# Patient Record
Sex: MALE | Race: OTHER | HISPANIC OR LATINO | URBAN - METROPOLITAN AREA
[De-identification: names, ages, dates, MRNs, and addresses within clinical notes are randomized per-mention and may not be internally consistent; named-entity substitution may affect disease eponyms.]

---

## 2019-01-04 ENCOUNTER — EMERGENCY (EMERGENCY)
Facility: HOSPITAL | Age: 59
LOS: 1 days | Discharge: HOME | End: 2019-01-04
Attending: EMERGENCY MEDICINE | Admitting: EMERGENCY MEDICINE
Payer: COMMERCIAL

## 2019-01-04 VITALS
DIASTOLIC BLOOD PRESSURE: 88 MMHG | RESPIRATION RATE: 18 BRPM | SYSTOLIC BLOOD PRESSURE: 158 MMHG | OXYGEN SATURATION: 100 % | TEMPERATURE: 98 F | HEART RATE: 93 BPM

## 2019-01-04 LAB
ALBUMIN SERPL ELPH-MCNC: 4.4 G/DL — SIGNIFICANT CHANGE UP (ref 3.3–5)
ALP SERPL-CCNC: 103 U/L — SIGNIFICANT CHANGE UP (ref 40–120)
ALT FLD-CCNC: 19 U/L — SIGNIFICANT CHANGE UP (ref 4–41)
AST SERPL-CCNC: 10 U/L — SIGNIFICANT CHANGE UP (ref 4–40)
B-OH-BUTYR SERPL-SCNC: 0.1 MMOL/L — SIGNIFICANT CHANGE UP (ref 0–0.4)
BASE EXCESS BLDV CALC-SCNC: -0.6 MMOL/L — SIGNIFICANT CHANGE UP
BILIRUB SERPL-MCNC: < 0.2 MG/DL — LOW (ref 0.2–1.2)
BLOOD GAS VENOUS - CREATININE: 1.2 MG/DL — SIGNIFICANT CHANGE UP (ref 0.5–1.3)
BUN SERPL-MCNC: 20 MG/DL — SIGNIFICANT CHANGE UP (ref 7–23)
CALCIUM SERPL-MCNC: 9.3 MG/DL — SIGNIFICANT CHANGE UP (ref 8.4–10.5)
CHLORIDE BLDV-SCNC: 103 MMOL/L — SIGNIFICANT CHANGE UP (ref 96–108)
CHLORIDE SERPL-SCNC: 99 MMOL/L — SIGNIFICANT CHANGE UP (ref 98–107)
CO2 SERPL-SCNC: 21 MMOL/L — LOW (ref 22–31)
CREAT SERPL-MCNC: 1.27 MG/DL — SIGNIFICANT CHANGE UP (ref 0.5–1.3)
GAS PNL BLDV: 136 MMOL/L — SIGNIFICANT CHANGE UP (ref 136–146)
GLUCOSE BLDV-MCNC: 343 — HIGH (ref 70–99)
GLUCOSE SERPL-MCNC: 342 MG/DL — HIGH (ref 70–99)
HBA1C BLD-MCNC: 11.7 % — HIGH (ref 4–5.6)
HCO3 BLDV-SCNC: 24 MMOL/L — SIGNIFICANT CHANGE UP (ref 20–27)
HCT VFR BLD CALC: 46.4 % — SIGNIFICANT CHANGE UP (ref 39–50)
HCT VFR BLDV CALC: 50.2 % — SIGNIFICANT CHANGE UP (ref 39–51)
HGB BLD-MCNC: 15.8 G/DL — SIGNIFICANT CHANGE UP (ref 13–17)
HGB BLDV-MCNC: 16.4 G/DL — SIGNIFICANT CHANGE UP (ref 13–17)
LACTATE BLDV-MCNC: 2.4 MMOL/L — HIGH (ref 0.5–2)
MCHC RBC-ENTMCNC: 25.9 PG — LOW (ref 27–34)
MCHC RBC-ENTMCNC: 34.1 % — SIGNIFICANT CHANGE UP (ref 32–36)
MCV RBC AUTO: 76.2 FL — LOW (ref 80–100)
NRBC # FLD: 0 K/UL — LOW (ref 25–125)
PCO2 BLDV: 39 MMHG — LOW (ref 41–51)
PH BLDV: 7.4 PH — SIGNIFICANT CHANGE UP (ref 7.32–7.43)
PLATELET # BLD AUTO: 292 K/UL — SIGNIFICANT CHANGE UP (ref 150–400)
PMV BLD: 10 FL — SIGNIFICANT CHANGE UP (ref 7–13)
PO2 BLDV: 54 MMHG — HIGH (ref 35–40)
POTASSIUM BLDV-SCNC: 3.8 MMOL/L — SIGNIFICANT CHANGE UP (ref 3.4–4.5)
POTASSIUM SERPL-MCNC: 3.8 MMOL/L — SIGNIFICANT CHANGE UP (ref 3.5–5.3)
POTASSIUM SERPL-SCNC: 3.8 MMOL/L — SIGNIFICANT CHANGE UP (ref 3.5–5.3)
PROT SERPL-MCNC: 7.6 G/DL — SIGNIFICANT CHANGE UP (ref 6–8.3)
RBC # BLD: 6.09 M/UL — HIGH (ref 4.2–5.8)
RBC # FLD: 14 % — SIGNIFICANT CHANGE UP (ref 10.3–14.5)
SAO2 % BLDV: 85.9 % — HIGH (ref 60–85)
SODIUM SERPL-SCNC: 133 MMOL/L — LOW (ref 135–145)
WBC # BLD: 6.17 K/UL — SIGNIFICANT CHANGE UP (ref 3.8–10.5)
WBC # FLD AUTO: 6.17 K/UL — SIGNIFICANT CHANGE UP (ref 3.8–10.5)

## 2019-01-04 PROCEDURE — 99245 OFF/OP CONSLTJ NEW/EST HI 55: CPT

## 2019-01-04 PROCEDURE — 99218: CPT

## 2019-01-04 RX ORDER — DEXTROSE 50 % IN WATER 50 %
15 SYRINGE (ML) INTRAVENOUS ONCE
Qty: 0 | Refills: 0 | Status: DISCONTINUED | OUTPATIENT
Start: 2019-01-04 | End: 2019-01-08

## 2019-01-04 RX ORDER — INSULIN LISPRO 100/ML
VIAL (ML) SUBCUTANEOUS AT BEDTIME
Qty: 0 | Refills: 0 | Status: DISCONTINUED | OUTPATIENT
Start: 2019-01-04 | End: 2019-01-08

## 2019-01-04 RX ORDER — SODIUM CHLORIDE 9 MG/ML
1000 INJECTION INTRAMUSCULAR; INTRAVENOUS; SUBCUTANEOUS ONCE
Qty: 0 | Refills: 0 | Status: COMPLETED | OUTPATIENT
Start: 2019-01-04 | End: 2019-01-04

## 2019-01-04 RX ORDER — FENOFIBRATE,MICRONIZED 130 MG
145 CAPSULE ORAL DAILY
Qty: 0 | Refills: 0 | Status: DISCONTINUED | OUTPATIENT
Start: 2019-01-04 | End: 2019-01-08

## 2019-01-04 RX ORDER — INSULIN LISPRO 100/ML
7 VIAL (ML) SUBCUTANEOUS
Qty: 0 | Refills: 0 | Status: DISCONTINUED | OUTPATIENT
Start: 2019-01-04 | End: 2019-01-08

## 2019-01-04 RX ORDER — GLUCAGON INJECTION, SOLUTION 0.5 MG/.1ML
1 INJECTION, SOLUTION SUBCUTANEOUS ONCE
Qty: 0 | Refills: 0 | Status: DISCONTINUED | OUTPATIENT
Start: 2019-01-04 | End: 2019-01-08

## 2019-01-04 RX ORDER — DEXTROSE 50 % IN WATER 50 %
25 SYRINGE (ML) INTRAVENOUS ONCE
Qty: 0 | Refills: 0 | Status: DISCONTINUED | OUTPATIENT
Start: 2019-01-04 | End: 2019-01-08

## 2019-01-04 RX ORDER — LISINOPRIL 2.5 MG/1
2.5 TABLET ORAL DAILY
Qty: 0 | Refills: 0 | Status: DISCONTINUED | OUTPATIENT
Start: 2019-01-04 | End: 2019-01-08

## 2019-01-04 RX ORDER — SODIUM CHLORIDE 9 MG/ML
1000 INJECTION, SOLUTION INTRAVENOUS
Qty: 0 | Refills: 0 | Status: DISCONTINUED | OUTPATIENT
Start: 2019-01-04 | End: 2019-01-08

## 2019-01-04 RX ORDER — INSULIN GLARGINE 100 [IU]/ML
21 INJECTION, SOLUTION SUBCUTANEOUS AT BEDTIME
Qty: 0 | Refills: 0 | Status: DISCONTINUED | OUTPATIENT
Start: 2019-01-04 | End: 2019-01-08

## 2019-01-04 RX ORDER — INSULIN LISPRO 100/ML
VIAL (ML) SUBCUTANEOUS
Qty: 0 | Refills: 0 | Status: DISCONTINUED | OUTPATIENT
Start: 2019-01-04 | End: 2019-01-08

## 2019-01-04 RX ORDER — DEXTROSE 50 % IN WATER 50 %
12.5 SYRINGE (ML) INTRAVENOUS ONCE
Qty: 0 | Refills: 0 | Status: DISCONTINUED | OUTPATIENT
Start: 2019-01-04 | End: 2019-01-08

## 2019-01-04 RX ADMIN — SODIUM CHLORIDE 1000 MILLILITER(S): 9 INJECTION INTRAMUSCULAR; INTRAVENOUS; SUBCUTANEOUS at 23:32

## 2019-01-04 NOTE — ED ADULT TRIAGE NOTE - CHIEF COMPLAINT QUOTE
Pt with PMH of DM and HTN, sent by PCP for eval of uncontrolled diabetes. Pt states that he went to PCP for a regular checkup due to an episode of vomiting he had yesterday, none today. Pt states he visited family members over the holidays who were sick. Pt on Metformin, reports taking it as prescribed, not checking his BS at home due to lack of test strips. Fs 353

## 2019-01-04 NOTE — ED PROVIDER NOTE - MEDICAL DECISION MAKING DETAILS
58M, hx DM HTN, sent to ED to eval for generalized fatigue and poorly controlled DM. No current symptoms other than generalized fatigue. . Will check labs including blood gas, HBa1C, BetaHydroxy. Likely CDU for further care, endo eval. Currently stable, no acute distress. Will continue to follow up and re-assess. Case discussed with Attending: Dane Portillo MD, PGY2 Emergency Medicine

## 2019-01-04 NOTE — ED CDU PROVIDER INITIAL DAY NOTE - ATTENDING CONTRIBUTION TO CARE
CDU MD ALARCON:  I performed a face to face bedside interview with patient regarding history of present illness, review of symptoms and past medical history. I completed an independent physical exam.  I have discussed patient's plan of care with PA.   I agree with note as stated above, having amended the EMR as needed to reflect my findings. I have discussed the assessment and plan of care.  This includes during the time I functioned as the attending physician for this patient.    57 y/o male poorly controlled diabetic sent by pcp to ED for better management of hyperglycemia.  No DKA.  Sent to CDU for endo consultation and recs in am.

## 2019-01-04 NOTE — ED CDU PROVIDER INITIAL DAY NOTE - MEDICAL DECISION MAKING DETAILS
Uncontrolled diabetes- IVF, Insulin recommendations as per endo :  Lantus 21 at bed time, humalog 7 per meal, moderate sliding for meal and bed time, Check FS, Endo consult AM with rpt morning labs

## 2019-01-04 NOTE — ED CDU PROVIDER INITIAL DAY NOTE - OBJECTIVE STATEMENT
58M, hx DM HTN presents from PCP due to poorly controlled blood glucose. Per patient, has been working very hard over last month, and has felt very weak, tired, run down over that period of time. Went to see PCP today (Heri Case) and SF was noted to be >450. patient sent to ED for further assessment. Patient states he vomited x1 yesterday, but has had many recent sick contacts. Denies any nausea or vomiting today. NO recent fevers or chills, headache, dizziness, lightheadedness, blurry vision, chest pain, shortness of breath, MOLINA, abdominal pain, urinary sx, cough, congestion, numbness. Has lost ~60 lbs over last year period with dieting. Denies tobacco, ethanol, or drug use. Has not checked FS in over 3 months due to lack of test strips. Daily meds include repaglinide 2mg  three times a day and glipizide 10mg  for his sugar which he States he is compliant with.   Does not follow with endocrinology. Currently denies symptoms other than generalized fatigue.   Pt sent to CDU for endo consult.  KEVEN austin who provided insulin recommendations/ will see pt in CDU in morning.

## 2019-01-04 NOTE — ED PROVIDER NOTE - OBJECTIVE STATEMENT
58M, hx DM HTN presents from PCP due to poorly controlled blood glucose. Per patient, has been working very hard over last month, and has felt very weak, tired, run down over that period of time. Went to see PCP today (Heri Case) and SF was noted to be >450. patient sent to ED for further assessment. Patient states he vomited x1 yesterday, but has had many recent sick contacts. Denies any nausea or vomiting today. NO recent fevers or chills, headache, dizziness, lightheadedness, blurry vision, chest pain, shortness of breath, MOLINA, abdominal pain, urinary sx, cough, congestion, numbness. Has lost ~100 lbs over last year period with dieting. Denies tobacco, ethanol, or drug use. Has not checked FS in over 3 months due to lack of test strips. Daily meds include repaglinide, fenofibrate, lisinopril, glipizide. States he is compliant with all meds. Does not follow with endocrinology. Currently denies symptoms other than generalized fatigue.

## 2019-01-04 NOTE — ED PROVIDER NOTE - ATTENDING CONTRIBUTION TO CARE
DR. ALARCON, ATTENDING MD-  I performed a face to face bedside interview with patient regarding history of present illness, review of symptoms and past medical history. I completed an independent physical exam.  I have discussed patient's plan of care with the resident.   Documentation as above in the note.    57 y/o male h/o dm, htn with high blood sugar, sent by pcp for eval.  Pt states n/v x1 yesterday and fatigue x1 month.  Denies f/c, ha, neck stiffness, cp, sob, cough, abd pain, n/v/d, dysuria, rash.  Afebrile, vs wnl, nad, ctabil, s1s2 rrr no m/r/g, abd soft non ttp no r/g, no cva tenderness b/l, no leg swelling b/l, no rash.  Poorly controlled diabetic, r/o dka, consider cdu for endo eval in am.

## 2019-01-04 NOTE — ED ADULT NURSE NOTE - OBJECTIVE STATEMENT
Ad RN: 58 y male A+Ox3 presents to the ER with the complaint of hyperglycemia. Pt states for the past couple of days he has been feeling under the weather and vomited yesterday prompting him to follow up with his pcp. At pcp, FS was 485 prompting PCP to to instruct pt to come to the ED. Pt states he has been taking oral diabetic meds and is currently not on insulin. States he has not been checking his FS for the past  3 months due to running out of test strips. Pt  states he has been urinating in the middle of the night 2-3x a night. 20 g iv placed on left forearm, labs drawn and sent. Endorsed report to primary RN Adiel.

## 2019-01-05 VITALS
SYSTOLIC BLOOD PRESSURE: 120 MMHG | DIASTOLIC BLOOD PRESSURE: 79 MMHG | OXYGEN SATURATION: 100 % | TEMPERATURE: 99 F | HEART RATE: 70 BPM | RESPIRATION RATE: 16 BRPM

## 2019-01-05 DIAGNOSIS — E78.3 HYPERCHYLOMICRONEMIA: ICD-10-CM

## 2019-01-05 DIAGNOSIS — I10 ESSENTIAL (PRIMARY) HYPERTENSION: ICD-10-CM

## 2019-01-05 DIAGNOSIS — E11.65 TYPE 2 DIABETES MELLITUS WITH HYPERGLYCEMIA: ICD-10-CM

## 2019-01-05 LAB
ALBUMIN SERPL ELPH-MCNC: 3.6 G/DL — SIGNIFICANT CHANGE UP (ref 3.3–5)
ALP SERPL-CCNC: 78 U/L — SIGNIFICANT CHANGE UP (ref 40–120)
ALT FLD-CCNC: 19 U/L — SIGNIFICANT CHANGE UP (ref 4–41)
AST SERPL-CCNC: 7 U/L — SIGNIFICANT CHANGE UP (ref 4–40)
BASE EXCESS BLDV CALC-SCNC: 1.1 MMOL/L — SIGNIFICANT CHANGE UP
BILIRUB SERPL-MCNC: 0.2 MG/DL — SIGNIFICANT CHANGE UP (ref 0.2–1.2)
BLOOD GAS VENOUS - CREATININE: 1.03 MG/DL — SIGNIFICANT CHANGE UP (ref 0.5–1.3)
BUN SERPL-MCNC: 17 MG/DL — SIGNIFICANT CHANGE UP (ref 7–23)
CALCIUM SERPL-MCNC: 9.1 MG/DL — SIGNIFICANT CHANGE UP (ref 8.4–10.5)
CHLORIDE BLDV-SCNC: 109 MMOL/L — HIGH (ref 96–108)
CHLORIDE SERPL-SCNC: 104 MMOL/L — SIGNIFICANT CHANGE UP (ref 98–107)
CO2 SERPL-SCNC: 24 MMOL/L — SIGNIFICANT CHANGE UP (ref 22–31)
CREAT SERPL-MCNC: 1.17 MG/DL — SIGNIFICANT CHANGE UP (ref 0.5–1.3)
GAS PNL BLDV: 132 MMOL/L — LOW (ref 136–146)
GLUCOSE BLDV-MCNC: 268 — HIGH (ref 70–99)
GLUCOSE SERPL-MCNC: 284 MG/DL — HIGH (ref 70–99)
HCO3 BLDV-SCNC: 25 MMOL/L — SIGNIFICANT CHANGE UP (ref 20–27)
HCT VFR BLDV CALC: 45 % — SIGNIFICANT CHANGE UP (ref 39–51)
HGB BLDV-MCNC: 14.7 G/DL — SIGNIFICANT CHANGE UP (ref 13–17)
LACTATE BLDV-MCNC: 1.6 MMOL/L — SIGNIFICANT CHANGE UP (ref 0.5–2)
PCO2 BLDV: 40 MMHG — LOW (ref 41–51)
PH BLDV: 7.41 PH — SIGNIFICANT CHANGE UP (ref 7.32–7.43)
PO2 BLDV: 52 MMHG — HIGH (ref 35–40)
POTASSIUM BLDV-SCNC: 3.4 MMOL/L — SIGNIFICANT CHANGE UP (ref 3.4–4.5)
POTASSIUM SERPL-MCNC: 3.7 MMOL/L — SIGNIFICANT CHANGE UP (ref 3.5–5.3)
POTASSIUM SERPL-SCNC: 3.7 MMOL/L — SIGNIFICANT CHANGE UP (ref 3.5–5.3)
PROT SERPL-MCNC: 6.1 G/DL — SIGNIFICANT CHANGE UP (ref 6–8.3)
SAO2 % BLDV: 84.1 % — SIGNIFICANT CHANGE UP (ref 60–85)
SODIUM SERPL-SCNC: 137 MMOL/L — SIGNIFICANT CHANGE UP (ref 135–145)

## 2019-01-05 PROCEDURE — 99217: CPT

## 2019-01-05 RX ORDER — METFORMIN HYDROCHLORIDE 850 MG/1
1 TABLET ORAL
Qty: 60 | Refills: 0 | OUTPATIENT
Start: 2019-01-05 | End: 2019-02-03

## 2019-01-05 RX ORDER — INSULIN LISPRO 100/ML
7 VIAL (ML) SUBCUTANEOUS
Qty: 1 | Refills: 0 | OUTPATIENT
Start: 2019-01-05 | End: 2019-02-03

## 2019-01-05 RX ORDER — ENOXAPARIN SODIUM 100 MG/ML
21 INJECTION SUBCUTANEOUS
Qty: 1 | Refills: 0 | OUTPATIENT
Start: 2019-01-05 | End: 2019-02-03

## 2019-01-05 RX ADMIN — INSULIN GLARGINE 21 UNIT(S): 100 INJECTION, SOLUTION SUBCUTANEOUS at 00:11

## 2019-01-05 RX ADMIN — LISINOPRIL 2.5 MILLIGRAM(S): 2.5 TABLET ORAL at 05:26

## 2019-01-05 RX ADMIN — Medication 7 UNIT(S): at 13:18

## 2019-01-05 RX ADMIN — Medication 2: at 13:19

## 2019-01-05 RX ADMIN — Medication 4: at 09:23

## 2019-01-05 RX ADMIN — Medication 7 UNIT(S): at 09:22

## 2019-01-05 NOTE — CONSULT NOTE ADULT - SUBJECTIVE AND OBJECTIVE BOX
HPI:      PAST MEDICAL & SURGICAL HISTORY:  HTN (hypertension)  DM (diabetes mellitus)  No significant past surgical history      FAMILY HISTORY:  No pertinent family history in first degree relatives      Social History:    Outpatient Medications:        MEDICATIONS  (STANDING):  dextrose 5%. 1000 milliLiter(s) (50 mL/Hr) IV Continuous <Continuous>  dextrose 50% Injectable 12.5 Gram(s) IV Push once  dextrose 50% Injectable 25 Gram(s) IV Push once  dextrose 50% Injectable 25 Gram(s) IV Push once  fenofibrate Tablet 145 milliGRAM(s) Oral daily  insulin glargine Injectable (LANTUS) 21 Unit(s) SubCutaneous at bedtime  insulin lispro (HumaLOG) corrective regimen sliding scale   SubCutaneous three times a day before meals  insulin lispro (HumaLOG) corrective regimen sliding scale   SubCutaneous at bedtime  insulin lispro Injectable (HumaLOG) 7 Unit(s) SubCutaneous three times a day before meals  lisinopril 2.5 milliGRAM(s) Oral daily    MEDICATIONS  (PRN):  dextrose 40% Gel 15 Gram(s) Oral once PRN Blood Glucose LESS THAN 70 milliGRAM(s)/deciLiter  glucagon  Injectable 1 milliGRAM(s) IntraMuscular once PRN Glucose <70 milliGRAM(s)/deciLiter      Allergies    No Known Allergies    Intolerances      Review of Systems:  Constitutional: No fever  Eyes: No blurry vision  Neuro: No tremors  HEENT: No pain  Cardiovascular: No chest pain, palpitations  Respiratory: No SOB, no cough  GI: No nausea, vomiting, abdominal pain  : No dysuria  Skin: no rash  Psych: no depression  Endocrine: no polyuria, polydipsia  Hem/lymph: no swelling  Osteoporosis: no fractures    ALL OTHER SYSTEMS REVIEWED AND NEGATIVE    UNABLE TO OBTAIN    PHYSICAL EXAM:  -----------------------------  VITALS: T(C): 36.8 (01-05-19 @ 05:07)  T(F): 98.2 (01-05-19 @ 05:07), Max: 98.2 (01-04-19 @ 20:02)  HR: 64 (01-05-19 @ 05:07) (64 - 93)  BP: 121/72 (01-05-19 @ 05:07) (121/72 - 158/88)  RR:  (16 - 18)  SpO2:  (97% - 100%)  Wt(kg): --  GENERAL: NAD, well-groomed, well-developed  EYES: No proptosis, no lid lag, anicteric  HEENT:  Atraumatic, Normocephalic, moist mucous membranes  THYROID: Normal size, no palpable nodules  RESPIRATORY: Clear to auscultation bilaterally; No rales, rhonchi, wheezing, or rubs  CARDIOVASCULAR: Regular rate and rhythm; No murmurs; no peripheral edema  GI: Soft, nontender, non distended, normal bowel sounds  SKIN: Dry, intact, No rashes or lesions  MUSCULOSKELETAL: Full range of motion, normal strength  NEURO: sensation intact, extraocular movements intact, no tremor, normal reflexes  PSYCH: Alert and oriented x 3, normal affect, normal mood  CUSHING'S SIGNS: no striae    POCT Blood Glucose.: 230 mg/dL (01-05-19 @ 09:15)  POCT Blood Glucose.: 253 mg/dL (01-05-19 @ 00:03)  POCT Blood Glucose.: 353 mg/dL (01-04-19 @ 20:04)                            15.8   6.17  )-----------( 292      ( 04 Jan 2019 21:10 )             46.4       01-05    137  |  104  |  17  ----------------------------<  284<H>  3.7   |  24  |  1.17    EGFR if : 80  EGFR if non : 69    Ca    9.1      01-05    TPro  6.1  /  Alb  3.6  /  TBili  0.2  /  DBili  x   /  AST  7   /  ALT  19  /  AlkPhos  78  01-05      Thyroid Function Tests:      Hemoglobin A1C, Whole Blood: 11.7 % <H> [4.0 - 5.6] (01-04-19 @ 21:10)          Radiology:   ------------------------ HPI: Patient is a 59 yo M with history of HTN, HLD, and Type 2 DM.  He was diagnosed with diabetes about 5-6 years ago.  There is no known retinopathy neuropathy, and nephropathy (egfr 69).  He has no prior history of CAD, CHF or CVA.  He works and resides in [place] and is visiting his family who lives in [place].  He travels back to [place] about twice a month.  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  He reports that over the last few days he had been ill with gastroenteritis.  He went to his PMD and was found to have glucose in 400s range and was sent to the ED.   He follows with PMD for DM management.  He was on metformin before and it was d/c due to it not working.  He is currently taking glipizide 10mg once daily and Prandin 2mg three times daily.  He denies hypoglycemia.  He denies polyuria and polydipsia.  He had intentional weight loss about 100 lbs over the last year.  In terms of diet, breakfast: egg, 2 toast, cup of coffee with sugar and slice of [place]; lunch: salad with protein, dinner: chicken, rice and beans or steak, rice and beans.   He has not been checking his glucose because he had no insurance before and he ran out of strips.       PAST MEDICAL & SURGICAL HISTORY:  HTN (hypertension)  DM (diabetes mellitus)  No significant past surgical history      FAMILY HISTORY:  Mother - DM  Father - DM    Social History:  No smoking  No alcohol  No illicit drug use    Outpatient Medications:  Fenofibrate 160mg daily   Glipizide 10mg daily  Prandin 2mg tid  Lisinopril 2.5mg daily     MEDICATIONS  (STANDING):  fenofibrate Tablet 145 milliGRAM(s) Oral daily  insulin glargine Injectable (LANTUS) 21 Unit(s) SubCutaneous at bedtime  insulin lispro (HumaLOG) corrective regimen sliding scale   SubCutaneous three times a day before meals  insulin lispro (HumaLOG) corrective regimen sliding scale   SubCutaneous at bedtime  insulin lispro Injectable (HumaLOG) 7 Unit(s) SubCutaneous three times a day before meals  lisinopril 2.5 milliGRAM(s) Oral daily    MEDICATIONS  (PRN):  dextrose 40% Gel 15 Gram(s) Oral once PRN Blood Glucose LESS THAN 70 milliGRAM(s)/deciLiter  glucagon  Injectable 1 milliGRAM(s) IntraMuscular once PRN Glucose <70 milliGRAM(s)/deciLiter      Allergies  Penicillin - swelling     Intolerances      Review of Systems:  Constitutional: No fever  Eyes: No blurry vision  Neuro: No tremors  HEENT: No pain  Cardiovascular: No chest pain, palpitations  Respiratory: No SOB, no cough  GI: No nausea, vomiting, abdominal pain  : No dysuria  Skin: no rash  Psych: no depression  Endocrine: no polyuria, polydipsia  Hem/lymph: no swelling  Osteoporosis: no fractures    ALL OTHER SYSTEMS REVIEWED AND NEGATIVE    PHYSICAL EXAM:  -----------------------------  VITALS: T(C): 36.8 (01-05-19 @ 05:07)  T(F): 98.2 (01-05-19 @ 05:07), Max: 98.2 (01-04-19 @ 20:02)  HR: 64 (01-05-19 @ 05:07) (64 - 93)  BP: 121/72 (01-05-19 @ 05:07) (121/72 - 158/88)  RR:  (16 - 18)  SpO2:  (97% - 100%)  Wt(kg): --  GENERAL: NAD, well-groomed, well-developed  EYES: No proptosis, no lid lag, anicteric  HEENT:  Atraumatic, Normocephalic, moist mucous membranes  THYROID: Normal size, no palpable nodules  RESPIRATORY: Clear to auscultation bilaterally; No rales, rhonchi, wheezing, or rubs  CARDIOVASCULAR: Regular rate and rhythm; No murmurs; no peripheral edema  GI: Soft, nontender, non distended, normal bowel sounds  SKIN: Dry, intact, No rashes or lesions  MUSCULOSKELETAL: Full range of motion, normal strength  NEURO: sensation intact, extraocular movements intact, no tremor, normal reflexes  PSYCH: Alert and oriented x 3, normal affect, normal mood  CUSHING'S SIGNS: no striae    POCT Blood Glucose.: 230 mg/dL (01-05-19 @ 09:15)  POCT Blood Glucose.: 253 mg/dL (01-05-19 @ 00:03)  POCT Blood Glucose.: 353 mg/dL (01-04-19 @ 20:04)                            15.8   6.17  )-----------( 292      ( 04 Jan 2019 21:10 )             46.4       01-05    137  |  104  |  17  ----------------------------<  284<H>  3.7   |  24  |  1.17    EGFR if : 80  EGFR if non : 69    Ca    9.1      01-05    TPro  6.1  /  Alb  3.6  /  TBili  0.2  /  DBili  x   /  AST  7   /  ALT  19  /  AlkPhos  78  01-05      Thyroid Function Tests:      Hemoglobin A1C, Whole Blood: 11.7 % <H> [4.0 - 5.6] (01-04-19 @ 21:10)          Radiology:   ------------------------

## 2019-01-05 NOTE — ED CDU PROVIDER DISPOSITION NOTE - CLINICAL COURSE
58M h/o DM presents with elevated blood sugar from PCP and generalized weakness. States he has been compliant w medications. In ED found to have hyperglycemia without signs of acidosis. Given insulin and IVF, see by endocrine and given recommendations for new DM medication regimen.

## 2019-01-05 NOTE — CONSULT NOTE ADULT - PROBLEM SELECTOR RECOMMENDATION 9
Discussed with patient that his A1C was uncontrolled.  He will need basal bolus regimen on discharge and he is agreeable.   -Lantus 21 units qhs  -Humalog/Novolog 7 units tid with meals  -While in patient, LDSS qac/hs  -FSG goal 100-180mg/dl while in patient  -Would also start metformin 500mg bid.   -Patient lives in [place] and he will follow up with endocrinology there.  He will have to establish visit, in the [facility], should follow up PMD DR. Evie De ([place] [facility])

## 2019-01-05 NOTE — CONSULT NOTE ADULT - ASSESSMENT
- Glucometer (ACCU_CHECK beto Conenct, Ascensia Contour Next EZ or One, Freestyle Freedome LITE or OneTouch Verio IQ)  - Glucometer test strips and lancets  (make sure compatible w glucometer), Dispense #100 (or #200) use as directed  - Lantus Solostar Pen (or Basaglar Kwikpen) 21 units before bedtime (dose TBD)  - Novolog pen or Humalog pen or Ademlog pen 7 units tid with meals.   - BD omid 4mm pen needles, dispsense 150 / 30 days. 57 yo M with history of HTN, HLD, uncontrolled Type 2 DM in ED for evaluation of severe hyperglycemia and uncontrolled diabetes mellitus.       RX for discharge:  - Glucometer (ACCU_CHECK beto Conenct, Ascensia Contour Next EZ or One, Freestyle Freedome LITE or OneTouch Verio IQ)  - Glucometer test strips and lancets  (make sure compatible w glucometer), Dispense #100 (or #200) use as directed  - Lantus Solostar Pen (or Basaglar Kwikpen) 21 units before bedtime (dose TBD)  - Novolog pen or Humalog pen or Ademlog pen 7 units tid with meals.   - BD omid 4mm pen needles, dispsense 150 / 30 days.

## 2019-01-05 NOTE — ED CDU PROVIDER DISPOSITION NOTE - NSFOLLOWUPINSTRUCTIONS_ED_ALL_ED_FT
Follow up with a primary care doctor within 48-72 hours. Follow up with an Endocrinologist within the week- you can call: [facility] Find a Physician helpline ([phone]) for assistance. Take copies of your reports given to you.  Take the medications as prescribed in this discharge summary. Increase your exercise and work on losing weight with the diet changes discussed. Test your glucose 3 times a day before meals and at bedtime. Record in a log book. If your sugars are running higher than 300 or less than 60 come to the emergency department. Worsening, continued or ANY new concerning symptoms return to the emergency department.

## 2019-01-05 NOTE — ED CDU PROVIDER SUBSEQUENT DAY NOTE - ATTENDING CONTRIBUTION TO CARE
58M h/o DM presents with elevated blood sugar from PCP and generalized weakness. States he has been compliant w medications. In ED found to have hyperglycemia without signs of acidosis. Given insulin and IVF, placed in CDU for monitoring. Now feels well. On exam well appearing, nad, mmm, lungs clear, rrr, abd soft, NT/ND, 2+ pulses, no edema, no rash. Plan for endocrine c/s.

## 2019-01-05 NOTE — ED CDU PROVIDER SUBSEQUENT DAY NOTE - PROGRESS NOTE DETAILS
Pt resting comfortably thru night, NAD. Pending endo consult this morning, morning labs Received sign out from MARCIAL Taylor. Patient reassessed. Blood sugar slightly trending down- 254 this am. Received insulin as per Endo recommendations. Endo to consult today. Patient seen by Endo. Blood sugars trending down appropriately. Recommends continued current management- Lantus 21units at bedtime, Humalog 7 units with each meal. Also recommends DC on Metformin 500mg 1 tab 2x/day. Pt lives out of state in [place]. Rx's sent to local pharmacy and patient agrees to find an Endocrinologist by where he lives- recommended him calling his insurance company for assistance.  Diet changes, weight loss, exercise all discussed.  Glucometer teaching performed as well as Insulin pen training. Strict return precautions disucssed.
